# Patient Record
Sex: MALE | Race: WHITE | NOT HISPANIC OR LATINO | URBAN - METROPOLITAN AREA
[De-identification: names, ages, dates, MRNs, and addresses within clinical notes are randomized per-mention and may not be internally consistent; named-entity substitution may affect disease eponyms.]

---

## 2019-09-05 ENCOUNTER — OUTPATIENT (OUTPATIENT)
Dept: OUTPATIENT SERVICES | Facility: HOSPITAL | Age: 64
LOS: 1 days | End: 2019-09-05
Payer: MEDICARE

## 2019-09-05 VITALS
WEIGHT: 216.49 LBS | HEIGHT: 70 IN | DIASTOLIC BLOOD PRESSURE: 79 MMHG | OXYGEN SATURATION: 98 % | SYSTOLIC BLOOD PRESSURE: 124 MMHG | RESPIRATION RATE: 15 BRPM | TEMPERATURE: 98 F | HEART RATE: 54 BPM

## 2019-09-05 DIAGNOSIS — Z98.890 OTHER SPECIFIED POSTPROCEDURAL STATES: Chronic | ICD-10-CM

## 2019-09-05 DIAGNOSIS — M75.42 IMPINGEMENT SYNDROME OF LEFT SHOULDER: ICD-10-CM

## 2019-09-05 DIAGNOSIS — M25.512 PAIN IN LEFT SHOULDER: ICD-10-CM

## 2019-09-05 DIAGNOSIS — Z01.818 ENCOUNTER FOR OTHER PREPROCEDURAL EXAMINATION: ICD-10-CM

## 2019-09-05 DIAGNOSIS — M75.122 COMPLETE ROTATOR CUFF TEAR OR RUPTURE OF LEFT SHOULDER, NOT SPECIFIED AS TRAUMATIC: ICD-10-CM

## 2019-09-05 LAB
ANION GAP SERPL CALC-SCNC: 6 MMOL/L — SIGNIFICANT CHANGE UP (ref 5–17)
BUN SERPL-MCNC: 21 MG/DL — SIGNIFICANT CHANGE UP (ref 7–23)
CALCIUM SERPL-MCNC: 9.4 MG/DL — SIGNIFICANT CHANGE UP (ref 8.4–10.5)
CHLORIDE SERPL-SCNC: 103 MMOL/L — SIGNIFICANT CHANGE UP (ref 96–108)
CO2 SERPL-SCNC: 29 MMOL/L — SIGNIFICANT CHANGE UP (ref 22–31)
CREAT SERPL-MCNC: 1.28 MG/DL — SIGNIFICANT CHANGE UP (ref 0.5–1.3)
GLUCOSE SERPL-MCNC: 103 MG/DL — HIGH (ref 70–99)
HCT VFR BLD CALC: 44.3 % — SIGNIFICANT CHANGE UP (ref 39–50)
HGB BLD-MCNC: 14.4 G/DL — SIGNIFICANT CHANGE UP (ref 13–17)
MCHC RBC-ENTMCNC: 28.6 PG — SIGNIFICANT CHANGE UP (ref 27–34)
MCHC RBC-ENTMCNC: 32.5 GM/DL — SIGNIFICANT CHANGE UP (ref 32–36)
MCV RBC AUTO: 88.1 FL — SIGNIFICANT CHANGE UP (ref 80–100)
NRBC # BLD: 0 /100 WBCS — SIGNIFICANT CHANGE UP (ref 0–0)
PLATELET # BLD AUTO: 279 K/UL — SIGNIFICANT CHANGE UP (ref 150–400)
POTASSIUM SERPL-MCNC: 4.6 MMOL/L — SIGNIFICANT CHANGE UP (ref 3.5–5.3)
POTASSIUM SERPL-SCNC: 4.6 MMOL/L — SIGNIFICANT CHANGE UP (ref 3.5–5.3)
RBC # BLD: 5.03 M/UL — SIGNIFICANT CHANGE UP (ref 4.2–5.8)
RBC # FLD: 13.4 % — SIGNIFICANT CHANGE UP (ref 10.3–14.5)
SODIUM SERPL-SCNC: 138 MMOL/L — SIGNIFICANT CHANGE UP (ref 135–145)
WBC # BLD: 7.43 K/UL — SIGNIFICANT CHANGE UP (ref 3.8–10.5)
WBC # FLD AUTO: 7.43 K/UL — SIGNIFICANT CHANGE UP (ref 3.8–10.5)

## 2019-09-05 PROCEDURE — 85027 COMPLETE CBC AUTOMATED: CPT

## 2019-09-05 PROCEDURE — 36415 COLL VENOUS BLD VENIPUNCTURE: CPT

## 2019-09-05 PROCEDURE — 93005 ELECTROCARDIOGRAM TRACING: CPT

## 2019-09-05 PROCEDURE — 93010 ELECTROCARDIOGRAM REPORT: CPT

## 2019-09-05 PROCEDURE — 80048 BASIC METABOLIC PNL TOTAL CA: CPT

## 2019-09-05 PROCEDURE — G0463: CPT

## 2019-09-05 RX ORDER — VORTIOXETINE 5 MG/1
1 TABLET, FILM COATED ORAL
Qty: 0 | Refills: 0 | DISCHARGE

## 2019-09-05 NOTE — H&P PST ADULT - SKIN COMMENTS
partial thickness abrasion on dorsal surface of left wrist-approx 0.5cm- open w/ no erythema or exudate

## 2019-09-05 NOTE — H&P PST ADULT - NSICDXFAMILYHX_GEN_ALL_CORE_FT
FAMILY HISTORY:  Family history of prostate cancer in father    Father  Still living? No  Family history of throat cancer, Age at diagnosis: Age Unknown    Mother  Still living? No  Family history of lung cancer, Age at diagnosis: Age Unknown

## 2019-09-05 NOTE — H&P PST ADULT - ASSESSMENT
63yo male patient scheduled for surgery on 9/24/19. He will obtain Medical and Cardiac Clearances. He will be NPO as per Anesthesia and will take Toprol XL, Enalapril and Dexilant (and if needed Alprazolam) with a sip of water on AM of surgery. He will hold CBD, Fish Oil, Tumeric, Meloxicam, COQ10 and Aspirin for one week pre-op. All pre-op instructions reviewed with patient.

## 2019-09-05 NOTE — H&P PST ADULT - NSICDXPASTSURGICALHX_GEN_ALL_CORE_FT
PAST SURGICAL HISTORY:  History of abdominal surgery lysis of adgesions, 1995    History of arthroscopy of left shoulder 2017    History of exploratory laparotomy age 13    History of hand surgery left, 2016, 2017    History of hernia repair 1971    History of shoulder surgery right, 1978

## 2019-09-05 NOTE — H&P PST ADULT - NSANTHOSAYNRD_GEN_A_CORE
No. DARIEL screening performed.  STOP BANG Legend: 0-2 = LOW Risk; 3-4 = INTERMEDIATE Risk; 5-8 = HIGH Risk

## 2019-09-05 NOTE — H&P PST ADULT - NSICDXPASTMEDICALHX_GEN_ALL_CORE_FT
PAST MEDICAL HISTORY:  CAD (coronary artery disease)     Chronic lower back pain     Dyslipidemia     GERD (gastroesophageal reflux disease)     History of basal cell carcinoma (BCC) excision nose, 2019    Hypertension PAST MEDICAL HISTORY:  CAD (coronary artery disease)     Chronic lower back pain     Dyslipidemia     GERD (gastroesophageal reflux disease)     H/O Olga's syndrome     History of basal cell carcinoma (BCC) excision nose, 2019    Hypertension     Sinus bradycardia     Traumatic brain injury s/p fall -2016- mild

## 2019-09-05 NOTE — H&P PST ADULT - NSICDXPROBLEM_GEN_ALL_CORE_FT
PROBLEM DIAGNOSES  Problem: Left shoulder pain  Assessment and Plan: LEFT Shoulder Arthroscopy, Debridement Synovectomy, Subacromial   Decompression, Distal Clavicle Resection, Possible Rotator Cuff Repair on 9/24/19.

## 2019-09-05 NOTE — H&P PST ADULT - MUSCULOSKELETAL
details… detailed exam no calf tenderness/decreased ROM due to pain/diminished strength/no joint swelling/no joint erythema/decreased ROM/no joint warmth/left shoulder

## 2019-09-05 NOTE — H&P PST ADULT - HISTORY OF PRESENT ILLNESS
65yo right hand dominant male patient with approximately 3 1/2yr history of progressively worsening left shoulder pain. He has had arthroscopic surgery on his left shoulder approximately 2yrs ago. He rates the pain at 7-8/10-constant radiating to his left bicep area. He is taking Gabapentin, Robaxin and CBD Oil and occasionally Meloxicam and uses ice applications to relieve the pain with partial relief. He has had Cortisone injections and PT w/o significant improvement. He was told that arthroscopy is recommended and presents today for PSTs.

## 2019-09-05 NOTE — H&P PST ADULT - ATTENDING COMMENTS
To the best of my ability as an orthopaedic surgeon, I assert this.  There are no changes orthopaedically.  Otherwise as per anesthesia and/or medicine.

## 2019-09-23 ENCOUNTER — TRANSCRIPTION ENCOUNTER (OUTPATIENT)
Age: 64
End: 2019-09-23

## 2019-09-23 NOTE — ASU PATIENT PROFILE, ADULT - PRESSURE ULCER(S)
Encounter Date: 9/19/2017       History     Chief Complaint   Patient presents with    Headache     since 0300, reports pain increased with light      59 y.o. Female with past medical history of hypertension, tobacco abuse, asthma presents to emergency department complaining of acute, bilateral headaches that woke her from sleep at around 3 AM this morning.  She states pain is throbbing in nature and was 9 out of 10 at onset.  She reports taking Aleve at 3 AM with minimal relief.  She states headache is currently 8 out of 10 in severity.        The history is provided by the patient.   Headache    This is a recurrent (every 3-4 months or so) problem. The problem has been unchanged. The pain is located in the bilateral region. The pain does not radiate. The quality of the pain is described as throbbing. Associated symptoms include coughing, nausea and photophobia. Pertinent negatives include no abdominal pain, fever, vomiting or weakness. The symptoms are aggravated by bright light. She has tried NSAIDs for the symptoms. The treatment provided no relief. Her past medical history is significant for sinus disease.     Review of patient's allergies indicates:   Allergen Reactions    Iodine and iodide containing products      Past Medical History:   Diagnosis Date    Asthma     Hypertension      History reviewed. No pertinent surgical history.  History reviewed. No pertinent family history.  Social History   Substance Use Topics    Smoking status: Current Every Day Smoker     Packs/day: 0.50    Smokeless tobacco: Never Used    Alcohol use No     Review of Systems   Constitutional: Negative for appetite change, fatigue and fever.   HENT: Positive for congestion and postnasal drip.    Eyes: Positive for photophobia. Negative for discharge and visual disturbance.   Respiratory: Positive for cough. Negative for shortness of breath.    Cardiovascular: Negative for chest pain, palpitations and leg swelling.    Gastrointestinal: Positive for nausea. Negative for abdominal pain and vomiting.   Genitourinary: Negative for dysuria and hematuria.   Musculoskeletal: Negative for arthralgias and myalgias.   Neurological: Positive for light-headedness and headaches. Negative for weakness.   All other systems reviewed and are negative.      Physical Exam     Initial Vitals [09/19/17 1615]   BP Pulse Resp Temp SpO2   119/69 72 18 97.2 °F (36.2 °C) 97 %      MAP       85.67         Physical Exam    Nursing note and vitals reviewed.  Constitutional: She appears well-developed and well-nourished. She is not diaphoretic. No distress.   HENT:   Head: Normocephalic and atraumatic.   Mouth/Throat: Oropharynx is clear and moist. No oropharyngeal exudate.   Eyes: EOM are normal. Pupils are equal, round, and reactive to light.   Neck: Normal range of motion. Neck supple.   Cardiovascular: Normal rate, regular rhythm and intact distal pulses.   No murmur heard.  Pulmonary/Chest: Breath sounds normal. No stridor. No respiratory distress.   Abdominal: Soft. Bowel sounds are normal. There is no tenderness.   Musculoskeletal: Normal range of motion. She exhibits no edema or tenderness.   Neurological: She is alert and oriented to person, place, and time. She has normal strength. No cranial nerve deficit.   Skin: Skin is warm and dry. No erythema. No pallor.   Psychiatric: She has a normal mood and affect.         ED Course   Procedures  Labs Reviewed   POCT GLUCOSE                               ED Course as of Oct 04 0601   Tue Sep 19, 2017   1901 Headache improving. Now 3/10. Requesting discharge.   [DL]      ED Course User Index  [DL] Gerard Adams MD     Labs Reviewed  Admission on 09/19/2017   Component Date Value Ref Range Status    POCT Glucose 09/19/2017 82  70 - 110 mg/dL Final        Imaging Reviewed    Imaging Results          CT Head Without Contrast (Final result)  Result time 09/19/17 17:46:38    Final result by Raman JACOBSEN  MD Lucrecia (09/19/17 17:46:38)                 Impression:        No acute intracranial abnormalities.            Electronically signed by: SAM CARTER MD  Date:     09/19/17  Time:    17:46              Narrative:    Comparison: None    Clinical history: Headache    Technique:    Axial images of the brain were obtained at 5-mm intervals from the skull base to the vertex without the administration of contrast.    Findings:    The brain is normally formed and exhibits normal density throughout.  There is no evidence of acute major vascular territory infarct, hemorrhage, or mass.  There is no hydrocephalus.  There are no abnormal extra-axial fluid collections.  There is hypoplasia involving the inferior left mastoid air cells, otherwise the visualized paranasal sinuses and mastoid air cells are clear, and there is no evidence of calvarial fracture.  The visualized soft tissues are unremarkable.                              Medications given in ED    Medications   diphenhydrAMINE injection 50 mg (50 mg Intramuscular Given 9/19/17 1805)   ketorolac injection 60 mg (60 mg Intramuscular Given 9/19/17 1805)   prochlorperazine injection Soln 10 mg (10 mg Intramuscular Given 9/19/17 1805)       Discharge Medications     Medication List with Changes/Refills   New Medications    BUTALBITAL-ACETAMINOPHEN-CAFFEINE -40 MG (FIORICET, ESGIC) -40 MG PER TABLET    Take 1 tablet by mouth every 4 (four) hours as needed for Headaches.   Current Medications    ALBUTEROL 90 MCG/ACTUATION INHALER    Inhale 1-2 puffs into the lungs every 6 (six) hours as needed for Wheezing. Rescue    CETIRIZINE (ZYRTEC) 10 MG TABLET    Take 1 tablet (10 mg total) by mouth once daily. May buy Over the Counter.    FLUTICASONE (FLONASE) 50 MCG/ACTUATION NASAL SPRAY    2 sprays by Each Nare route 2 (two) times daily as needed for Rhinitis or Allergies.    IBUPROFEN (ADVIL,MOTRIN) 600 MG TABLET    Take 1 tablet (600 mg total) by mouth every 6  (six) hours as needed for Pain or Temperature greater than (100.4).    LISINOPRIL (PRINIVIL,ZESTRIL) 40 MG TABLET    Take 40 mg by mouth once daily.             Patient discharged to home in stable condition with instructions to:   1. Please take all meds as prescribed.  2. Follow-up with your primary care doctor   3. Return precautions discussed and patient and/or family/caretaker understands to return to the emergency room for any concerns including worsening of your current symptoms, fever, chills, night sweats, worsening pain, chest pain, shortness of breath, nausea, vomiting, diarrhea, bleeding, headache, difficulty talking, visual disturbances, weakness, numbness or any other acute concerns    Note was created using voice recognition software. Note may have occasional typographical errors that may not have been identified and edited despite good xander initial review prior to signing.    Clinical Impression:   The encounter diagnosis was Acute non intractable tension-type headache.                           Gerard Adams MD  10/04/17 0601     no

## 2019-09-23 NOTE — ASU PATIENT PROFILE, ADULT - PSH
History of abdominal surgery  lysis of adgesions, 1995  History of arthroscopy of left shoulder  2017  History of exploratory laparotomy  age 13  History of hand surgery  left, 2016, 2017  History of hernia repair  1971  History of shoulder surgery  right, 1978

## 2019-09-23 NOTE — ASU PATIENT PROFILE, ADULT - PMH
CAD (coronary artery disease)    Chronic lower back pain    Dyslipidemia    GERD (gastroesophageal reflux disease)    H/O Olga's syndrome    History of basal cell carcinoma (BCC) excision  nose, 2019  Hypertension    Sinus bradycardia    Traumatic brain injury  s/p fall -2016- mild

## 2019-09-24 ENCOUNTER — OUTPATIENT (OUTPATIENT)
Dept: OUTPATIENT SERVICES | Facility: HOSPITAL | Age: 64
LOS: 1 days | End: 2019-09-24
Payer: MEDICARE

## 2019-09-24 ENCOUNTER — RESULT REVIEW (OUTPATIENT)
Age: 64
End: 2019-09-24

## 2019-09-24 VITALS
RESPIRATION RATE: 13 BRPM | SYSTOLIC BLOOD PRESSURE: 106 MMHG | DIASTOLIC BLOOD PRESSURE: 62 MMHG | OXYGEN SATURATION: 95 % | HEART RATE: 69 BPM

## 2019-09-24 VITALS
HEART RATE: 59 BPM | OXYGEN SATURATION: 97 % | WEIGHT: 210.1 LBS | TEMPERATURE: 98 F | HEIGHT: 70 IN | DIASTOLIC BLOOD PRESSURE: 77 MMHG | RESPIRATION RATE: 16 BRPM | SYSTOLIC BLOOD PRESSURE: 120 MMHG

## 2019-09-24 DIAGNOSIS — M75.122 COMPLETE ROTATOR CUFF TEAR OR RUPTURE OF LEFT SHOULDER, NOT SPECIFIED AS TRAUMATIC: ICD-10-CM

## 2019-09-24 DIAGNOSIS — Z98.890 OTHER SPECIFIED POSTPROCEDURAL STATES: Chronic | ICD-10-CM

## 2019-09-24 DIAGNOSIS — M75.42 IMPINGEMENT SYNDROME OF LEFT SHOULDER: ICD-10-CM

## 2019-09-24 DIAGNOSIS — Z01.818 ENCOUNTER FOR OTHER PREPROCEDURAL EXAMINATION: ICD-10-CM

## 2019-09-24 PROCEDURE — 29824 SHO ARTHRS SRG DSTL CLAVICLC: CPT | Mod: LT

## 2019-09-24 PROCEDURE — 88304 TISSUE EXAM BY PATHOLOGIST: CPT

## 2019-09-24 PROCEDURE — 29828 SHO ARTHRS SRG BICP TENODSIS: CPT | Mod: LT

## 2019-09-24 PROCEDURE — 29826 SHO ARTHRS SRG DECOMPRESSION: CPT | Mod: LT

## 2019-09-24 PROCEDURE — 88304 TISSUE EXAM BY PATHOLOGIST: CPT | Mod: 26

## 2019-09-24 RX ORDER — ASPIRIN/CALCIUM CARB/MAGNESIUM 324 MG
1 TABLET ORAL
Qty: 0 | Refills: 0 | DISCHARGE

## 2019-09-24 RX ORDER — MILK THISTLE 150 MG
1 CAPSULE ORAL
Qty: 0 | Refills: 0 | DISCHARGE

## 2019-09-24 RX ORDER — UBIDECARENONE 100 MG
0 CAPSULE ORAL
Qty: 0 | Refills: 0 | DISCHARGE

## 2019-09-24 RX ORDER — OMEGA-3 ACID ETHYL ESTERS 1 G
0 CAPSULE ORAL
Qty: 0 | Refills: 0 | DISCHARGE

## 2019-09-24 RX ORDER — FENOFIBRATE,MICRONIZED 130 MG
1 CAPSULE ORAL
Qty: 0 | Refills: 0 | DISCHARGE

## 2019-09-24 RX ORDER — ONDANSETRON 8 MG/1
4 TABLET, FILM COATED ORAL EVERY 4 HOURS
Refills: 0 | Status: DISCONTINUED | OUTPATIENT
Start: 2019-09-24 | End: 2019-09-24

## 2019-09-24 RX ORDER — ALPRAZOLAM 0.25 MG
1 TABLET ORAL
Qty: 0 | Refills: 0 | DISCHARGE

## 2019-09-24 RX ORDER — MULTIVIT-MIN/FERROUS GLUCONATE 9 MG/15 ML
1 LIQUID (ML) ORAL
Qty: 0 | Refills: 0 | DISCHARGE

## 2019-09-24 RX ORDER — CEFAZOLIN SODIUM 1 G
2000 VIAL (EA) INJECTION ONCE
Refills: 0 | Status: COMPLETED | OUTPATIENT
Start: 2019-09-24 | End: 2019-09-24

## 2019-09-24 RX ORDER — SODIUM CHLORIDE 9 MG/ML
1000 INJECTION, SOLUTION INTRAVENOUS
Refills: 0 | Status: DISCONTINUED | OUTPATIENT
Start: 2019-09-24 | End: 2019-09-24

## 2019-09-24 RX ORDER — CHLORHEXIDINE GLUCONATE 213 G/1000ML
1 SOLUTION TOPICAL ONCE
Refills: 0 | Status: COMPLETED | OUTPATIENT
Start: 2019-09-24 | End: 2019-09-24

## 2019-09-24 RX ORDER — ATORVASTATIN CALCIUM 80 MG/1
1 TABLET, FILM COATED ORAL
Qty: 0 | Refills: 0 | DISCHARGE

## 2019-09-24 RX ORDER — L.ACIDOPH/B.ANIMALIS/B.LONGUM 15B CELL
1 CAPSULE ORAL
Qty: 0 | Refills: 0 | DISCHARGE

## 2019-09-24 RX ORDER — DEXLANSOPRAZOLE 30 MG/1
1 CAPSULE, DELAYED RELEASE ORAL
Qty: 0 | Refills: 0 | DISCHARGE

## 2019-09-24 RX ORDER — ONDANSETRON 8 MG/1
4 TABLET, FILM COATED ORAL EVERY 4 HOURS
Refills: 0 | Status: DISCONTINUED | OUTPATIENT
Start: 2019-09-24 | End: 2019-10-19

## 2019-09-24 RX ORDER — ONDANSETRON 8 MG/1
4 TABLET, FILM COATED ORAL ONCE
Refills: 0 | Status: DISCONTINUED | OUTPATIENT
Start: 2019-09-24 | End: 2019-09-24

## 2019-09-24 RX ORDER — CYCLOBENZAPRINE HYDROCHLORIDE 10 MG/1
1 TABLET, FILM COATED ORAL
Qty: 0 | Refills: 0 | DISCHARGE

## 2019-09-24 RX ORDER — CHOLECALCIFEROL (VITAMIN D3) 125 MCG
1 CAPSULE ORAL
Qty: 0 | Refills: 0 | DISCHARGE

## 2019-09-24 RX ORDER — VORTIOXETINE 5 MG/1
1 TABLET, FILM COATED ORAL
Qty: 0 | Refills: 0 | DISCHARGE

## 2019-09-24 RX ORDER — METOPROLOL TARTRATE 50 MG
1 TABLET ORAL
Qty: 0 | Refills: 0 | DISCHARGE

## 2019-09-24 RX ORDER — HYDROMORPHONE HYDROCHLORIDE 2 MG/ML
0.5 INJECTION INTRAMUSCULAR; INTRAVENOUS; SUBCUTANEOUS
Refills: 0 | Status: DISCONTINUED | OUTPATIENT
Start: 2019-09-24 | End: 2019-09-24

## 2019-09-24 RX ORDER — GABAPENTIN 400 MG/1
0 CAPSULE ORAL
Qty: 0 | Refills: 0 | DISCHARGE

## 2019-09-24 RX ORDER — KETOROLAC TROMETHAMINE 30 MG/ML
15 SYRINGE (ML) INJECTION ONCE
Refills: 0 | Status: DISCONTINUED | OUTPATIENT
Start: 2019-09-24 | End: 2019-09-24

## 2019-09-24 RX ORDER — MELOXICAM 15 MG/1
1 TABLET ORAL
Qty: 0 | Refills: 0 | DISCHARGE

## 2019-09-24 RX ORDER — DIPHENHYDRAMINE HCL 50 MG
50 CAPSULE ORAL EVERY 4 HOURS
Refills: 0 | Status: DISCONTINUED | OUTPATIENT
Start: 2019-09-24 | End: 2019-10-19

## 2019-09-24 RX ORDER — METHOCARBAMOL 500 MG/1
0 TABLET, FILM COATED ORAL
Qty: 0 | Refills: 0 | DISCHARGE

## 2019-09-24 RX ORDER — ASCORBIC ACID 60 MG
1 TABLET,CHEWABLE ORAL
Qty: 0 | Refills: 0 | DISCHARGE

## 2019-09-24 RX ADMIN — CHLORHEXIDINE GLUCONATE 1 APPLICATION(S): 213 SOLUTION TOPICAL at 10:06

## 2019-09-24 RX ADMIN — Medication 15 MILLIGRAM(S): at 13:41

## 2019-09-24 RX ADMIN — SODIUM CHLORIDE 50 MILLILITER(S): 9 INJECTION, SOLUTION INTRAVENOUS at 13:40

## 2019-09-24 RX ADMIN — Medication 15 MILLIGRAM(S): at 14:07

## 2019-09-24 NOTE — BRIEF OPERATIVE NOTE - OPERATION/FINDINGS
Left Shoulder Arthroscopy with Glenohumeral Debridement, Left Shoulder Arthroscopy with Glenohumeral Debridement, Biceps Tenodesis, Subacromial Decompression, Distal Clavicle Resection

## 2019-09-24 NOTE — ASU DISCHARGE PLAN (ADULT/PEDIATRIC) - ACTIVITY LEVEL
No weight bearing/No excercise/No sports/gym/No heavy lifting No weight bearing/No excercise/No heavy lifting/No sports/gym/Elevate extremity

## 2019-09-24 NOTE — ASU DISCHARGE PLAN (ADULT/PEDIATRIC) - CALL YOUR DOCTOR IF YOU HAVE ANY OF THE FOLLOWING:
Nausea and vomiting that does not stop/Swelling that gets worse/Bleeding that does not stop/Pain not relieved by Medications/Fever greater than (need to indicate Fahrenheit or Celsius)/Numbness, tingling, color or temperature change to extremity/Wound/Surgical Site with redness, or foul smelling discharge or pus

## 2022-10-06 ENCOUNTER — RESULT REVIEW (OUTPATIENT)
Age: 67
End: 2022-10-06

## 2022-10-06 ENCOUNTER — OUTPATIENT (OUTPATIENT)
Dept: OUTPATIENT SERVICES | Facility: HOSPITAL | Age: 67
LOS: 1 days | End: 2022-10-06
Payer: MEDICARE

## 2022-10-06 DIAGNOSIS — Z98.890 OTHER SPECIFIED POSTPROCEDURAL STATES: Chronic | ICD-10-CM

## 2022-10-06 DIAGNOSIS — Q40.1 CONGENITAL HIATUS HERNIA: ICD-10-CM

## 2022-10-06 PROBLEM — I10 ESSENTIAL (PRIMARY) HYPERTENSION: Chronic | Status: ACTIVE | Noted: 2019-09-04

## 2022-10-06 PROBLEM — K21.9 GASTRO-ESOPHAGEAL REFLUX DISEASE WITHOUT ESOPHAGITIS: Chronic | Status: ACTIVE | Noted: 2019-09-04

## 2022-10-06 PROBLEM — Z86.69 PERSONAL HISTORY OF OTHER DISEASES OF THE NERVOUS SYSTEM AND SENSE ORGANS: Chronic | Status: ACTIVE | Noted: 2019-09-05

## 2022-10-06 PROBLEM — I25.10 ATHEROSCLEROTIC HEART DISEASE OF NATIVE CORONARY ARTERY WITHOUT ANGINA PECTORIS: Chronic | Status: ACTIVE | Noted: 2019-09-04

## 2022-10-06 PROBLEM — M54.5 LOW BACK PAIN: Chronic | Status: ACTIVE | Noted: 2019-09-04

## 2022-10-06 PROBLEM — R00.1 BRADYCARDIA, UNSPECIFIED: Chronic | Status: ACTIVE | Noted: 2019-09-05

## 2022-10-06 PROBLEM — E78.5 HYPERLIPIDEMIA, UNSPECIFIED: Chronic | Status: ACTIVE | Noted: 2019-09-04

## 2022-10-06 PROBLEM — S06.9X9A UNSPECIFIED INTRACRANIAL INJURY WITH LOSS OF CONSCIOUSNESS OF UNSPECIFIED DURATION, INITIAL ENCOUNTER: Chronic | Status: ACTIVE | Noted: 2019-09-05

## 2022-10-06 PROCEDURE — 74220 X-RAY XM ESOPHAGUS 1CNTRST: CPT | Mod: 26

## 2022-10-06 PROCEDURE — 74220 X-RAY XM ESOPHAGUS 1CNTRST: CPT

## 2022-12-01 PROBLEM — Z00.00 ENCOUNTER FOR PREVENTIVE HEALTH EXAMINATION: Status: ACTIVE | Noted: 2022-12-01

## 2022-12-05 ENCOUNTER — APPOINTMENT (OUTPATIENT)
Dept: PAIN MANAGEMENT | Facility: CLINIC | Age: 67
End: 2022-12-05

## 2022-12-05 RX ORDER — EZETIMIBE 10 MG/1
10 TABLET ORAL
Qty: 30 | Refills: 0 | Status: ACTIVE | COMMUNITY
Start: 2022-05-09

## 2022-12-05 RX ORDER — MOMETASONE FUROATE 1 MG/G
0.1 OINTMENT TOPICAL
Qty: 45 | Refills: 0 | Status: ACTIVE | COMMUNITY
Start: 2022-10-31

## 2022-12-05 RX ORDER — VORTIOXETINE 10 MG/1
10 TABLET, FILM COATED ORAL
Qty: 90 | Refills: 0 | Status: ACTIVE | COMMUNITY
Start: 2022-11-28

## 2022-12-05 RX ORDER — METOPROLOL SUCCINATE 50 MG/1
50 TABLET, EXTENDED RELEASE ORAL
Qty: 90 | Refills: 0 | Status: ACTIVE | COMMUNITY
Start: 2021-12-28

## 2022-12-05 RX ORDER — ATORVASTATIN CALCIUM 40 MG/1
40 TABLET, FILM COATED ORAL
Qty: 90 | Refills: 0 | Status: ACTIVE | COMMUNITY
Start: 2021-12-28

## 2022-12-05 RX ORDER — AMLODIPINE BESYLATE 5 MG/1
5 TABLET ORAL
Qty: 90 | Refills: 0 | Status: ACTIVE | COMMUNITY
Start: 2021-12-28

## 2022-12-21 ENCOUNTER — APPOINTMENT (OUTPATIENT)
Dept: ORTHOPEDIC SURGERY | Facility: CLINIC | Age: 67
End: 2022-12-21

## 2023-01-10 ENCOUNTER — APPOINTMENT (OUTPATIENT)
Dept: PAIN MANAGEMENT | Facility: CLINIC | Age: 68
End: 2023-01-10

## 2023-06-21 ENCOUNTER — APPOINTMENT (OUTPATIENT)
Dept: PAIN MANAGEMENT | Facility: CLINIC | Age: 68
End: 2023-06-21
Payer: SELF-PAY

## 2023-06-21 VITALS — WEIGHT: 220 LBS | BODY MASS INDEX: 31.5 KG/M2 | HEIGHT: 70 IN

## 2023-06-21 DIAGNOSIS — Z86.59 PERSONAL HISTORY OF OTHER MENTAL AND BEHAVIORAL DISORDERS: ICD-10-CM

## 2023-06-21 DIAGNOSIS — R73.03 PREDIABETES.: ICD-10-CM

## 2023-06-21 PROCEDURE — 99214 OFFICE O/P EST MOD 30 MIN: CPT

## 2023-06-21 PROCEDURE — 99204 OFFICE O/P NEW MOD 45 MIN: CPT

## 2023-06-21 NOTE — HISTORY OF PRESENT ILLNESS
[Lower back] : lower back [10] : 10 [3] : 3 [Dull/Aching] : dull/aching [Radiating] : radiating [Shooting] : shooting [Constant] : constant [Household chores] : household chores [Meds] : meds [Extending back] : extending back [Ice] : ice [Heat] : heat [Sitting] : sitting [Standing] : standing [FreeTextEntry1] : 06/21/2023: follow up today.  He was hospitalized for SBO and pain got worse. Had YI's in the past with relief.  \par Pain in the right buttock and down the right leg to the shin and foot.  Same pain as he has had in the past. Pain in the left thigh when he lays down at night.  taking gabapentin 300mg BID Last right L4/5 L5/S1 TFESI was 12/13/19. \par \par 5/19/21- Patient complains of worsening pain in low back and radiating down right leg. Had epidurals in past with relief.\par \par MRI : 3/15/19: IMPRESSION:\par 1. Multilevel spondylosis with disc desiccation, disc bulges and arthrosis throughout the lumbar \par spine and further detailed above; relatively unchanged.\par 2. Posterior central disc herniation with superior disc extrusion at L1/2; relatively unchanged with \par the exception of the previously evident high signal within and around the site of herniation is \par decreased when compared to prior exam.\par 3. Previously evident right foraminal disc herniation has decreased in size at L2/3 when compared to \par prior exam.\par 4. Central canal stenosis and bilateral neuroforaminal narrowing at L2/3; unchanged.\par 5. Grade 1 retrolisthesis L3, bilateral neuroforaminal narrowing and contact left \par foraminal/extraforaminal L3 nerve root at L3/4.\par 6. Annular tear at the 9 o’clock position at L3/4; not evident on prior exam.\par 7. Right posterior inferior facet cyst is decreased in size at L3/4 when compared to prior exam.\par 8. Bilateral neuroforaminal narrowing with contact bilateral foraminal L4 nerve root at L4/5; \par unchanged.\par 9. Bilateral neuroforaminal narrowing with contact/compression right foraminal L5 nerve root at \par L5/S1; unchanged [] : no [FreeTextEntry7] : from buttocks to right leg and foot  dull pain on the left side  [FreeTextEntry9] : gabapentin  [de-identified] : dec 2019 [TWNoteComboBox1] : 100%

## 2023-06-21 NOTE — PHYSICAL EXAM
[NL (90)] : forward flexion 90 degrees [Extension] : extension [] : no lumbar paraspinal tenderness [de-identified] : extension 20 degrees

## 2023-06-21 NOTE — ASSESSMENT
[FreeTextEntry1] : After discussing various treatment options with the patient including but not limited to oral medications, physical therapy, exercise, modalities as well as interventional spinal injections, we have decided with the following plan:\par \par 1) Intervention Injection Therapy:\par I personally reviewed the MRI/CT scan images and agree with the radiologist's report. The radiological findings were discussed with the patient.\par The risks, benefits, contents and alternatives to injection were explained in full to the patient. Risks outlined include but are not limited to infection,sepsis, bleeding, post-dural puncture headache, nerve damage, temporary increase in pain, syncopal episode, failure to resolve symptoms, allergic reaction, symptom recurrence, and elevation of blood sugar in diabetics. Cortisone may cause immunosuppression. Patient understands the risks. All questions were answered. After discussion of options, patient requested an injection. Information regarding the injection was given to the patient. Which medications to stop prior to the injection was explained to the patient as well.\par \par Follow up in 1-2 weeks post injection for re-evaluation. \par Continue Home exercises, stretching, activity modification, physical therapy, and conservative care.\par \par Patient is presenting with acute/sub-acute radicular pain with impairment in ADLs and functionality.  The pain has not responded sufficiently to  conservative care including nsaid therapy and/or physical therapy.  There is no bleeding tendency, unstable medical condition, or systemic infection. The purpose of the spinal injections is to facilitate active therapy by providing short term relief through reduction of pain and inflammation. \par \par Injections, by themselves, are not likely to provide long-term relief. Rather, active rehabilitation with modified work achieves long-term relief by increasing active ROM, strength and stability. \par \par right L4/5 L5/S1 TFESI\par \par 2) script for zero gravity chair \par \par \par \par \par

## 2023-08-16 ENCOUNTER — APPOINTMENT (OUTPATIENT)
Dept: PAIN MANAGEMENT | Facility: CLINIC | Age: 68
End: 2023-08-16
Payer: MEDICARE

## 2023-08-16 PROCEDURE — 64483 NJX AA&/STRD TFRM EPI L/S 1: CPT | Mod: RT

## 2023-08-16 PROCEDURE — 64484 NJX AA&/STRD TFRM EPI L/S EA: CPT | Mod: RT

## 2023-08-16 NOTE — PROCEDURE
[FreeTextEntry3] : Date of Service: 08/16/2023   Account: [id number]  Patient: ROLANDO BERNABE   YOB: 1955  Age: 68 year   Surgeon: Jennifer Block M.D.  Assistant: None.  Pre-Operative Diagnosis: Lumbosacral Radiculitis (M54.17)  Post Operative Diagnosis: Lumbosacral Radiculitis (M54.17)  Procedure: Right L4-5, L5-S1 transforaminal epidural steroid injection under fluoroscopic guidance.  Anesthesia:             MAC   This procedure was carried out using fluoroscopic guidance.  The risks and benefits of the procedure were discussed extensively with the patient.  The consent of the patient was obtained and the following procedure was performed. The patient was placed in the prone position on the fluoroscopic table and the lumbar area was prepped and draped in a sterile fashion.  The right L4-5 and L5-S1 neural foramen were identified on right oblique  "jessie dog" anatomical view at the 6 o' clock position using fluoroscopic guidance, and the area was marked. The overlying skin and subcutaneous structures were anesthetized using sterile technique with 1% Lidocaine.  A 22 gauge spinal needle was directed toward the inferior (6o'clock) position of the pedicle, which formed the roof of the identified foramen.  Once in the epidural space, after negative aspiration for heme and CSF, 1cc of Omnipaque contrast was injected to confirm epidural location and assess filling defects and rule out intravascular needle placement.   The following contrast flow and epidurogram was observed: no intravascular or intrathecal flow pattern was noted.  No blood or CSF was aspirated. Omnipaque spread appeared to outline the right L4 and L5 nerve roots and spread medially into the epidural space.    After this, an injectate of 3 cc preservative free normal saline plus 40 mg of Kenalog was injected in the epidural space at each of the two levels.  The needle was subsequently removed.  Vital signs remained normal.  Pulse oximeter was used throughout the procedure and the patient's pulse and oxygen saturation remained within normal limits.  The patient tolerated the procedure well.  There were no complications.  The patient was instructed to apply ice over the injection sites for twenty minutes every two hours for the next 24 to 48 hours.  The patient was also instructed to contact me immediately if there were any problems.  Jennifer Block M.D.

## 2023-08-30 ENCOUNTER — APPOINTMENT (OUTPATIENT)
Dept: PAIN MANAGEMENT | Facility: CLINIC | Age: 68
End: 2023-08-30
Payer: SELF-PAY

## 2023-08-30 VITALS — BODY MASS INDEX: 31.5 KG/M2 | WEIGHT: 220 LBS | HEIGHT: 70 IN

## 2023-08-30 PROCEDURE — 99213 OFFICE O/P EST LOW 20 MIN: CPT

## 2023-08-30 RX ORDER — METHOCARBAMOL 750 MG/1
750 TABLET, FILM COATED ORAL
Qty: 90 | Refills: 0 | Status: ACTIVE | COMMUNITY
Start: 2023-08-30 | End: 1900-01-01

## 2023-08-30 NOTE — HISTORY OF PRESENT ILLNESS
[Lower back] : lower back [10] : 10 [Dull/Aching] : dull/aching [Radiating] : radiating [Shooting] : shooting [Constant] : constant [Household chores] : household chores [Meds] : meds [Extending back] : extending back [Ice] : ice [Heat] : heat [Sitting] : sitting [Standing] : standing [3] : 3 [0] : 0 [FreeTextEntry1] : 8/30/23- fu for right L4/5, L5/S1 TFESI on 8/16. Had 60% relief.   just has pain in left thigh. getting alot of cramping in b/l hamstrings.  Will get new MRI as he has pain in left side. Into left thigh.     06/21/2023: follow up today.  He was hospitalized for SBO and pain got worse. Had YI's in the past with relief.   Pain in the right buttock and down the right leg to the shin and foot.  Same pain as he has had in the past. Pain in the left thigh when he lays down at night.  taking gabapentin 300mg BID Last right L4/5 L5/S1 TFESI was 12/13/19.   5/19/21- Patient complains of worsening pain in low back and radiating down right leg. Had epidurals in past with relief.  MRI : 3/15/19: IMPRESSION: 1. Multilevel spondylosis with disc desiccation, disc bulges and arthrosis throughout the lumbar  spine and further detailed above; relatively unchanged. 2. Posterior central disc herniation with superior disc extrusion at L1/2; relatively unchanged with  the exception of the previously evident high signal within and around the site of herniation is  decreased when compared to prior exam. 3. Previously evident right foraminal disc herniation has decreased in size at L2/3 when compared to  prior exam. 4. Central canal stenosis and bilateral neuroforaminal narrowing at L2/3; unchanged. 5. Grade 1 retrolisthesis L3, bilateral neuroforaminal narrowing and contact left  foraminal/extraforaminal L3 nerve root at L3/4. 6. Annular tear at the 9 o'clock position at L3/4; not evident on prior exam. 7. Right posterior inferior facet cyst is decreased in size at L3/4 when compared to prior exam. 8. Bilateral neuroforaminal narrowing with contact bilateral foraminal L4 nerve root at L4/5;  unchanged. 9. Bilateral neuroforaminal narrowing with contact/compression right foraminal L5 nerve root at  L5/S1; unchanged [] : no [FreeTextEntry7] : from buttocks to right leg and foot  dull pain on the left side  [FreeTextEntry9] : gabapentin  [de-identified] : dec 2019 [TWNoteComboBox1] : 100%

## 2023-08-30 NOTE — REASON FOR VISIT
[Initial Consultation] : an initial pain management consultation [Follow-Up Visit] : a follow-up pain management visit

## 2023-08-30 NOTE — PHYSICAL EXAM
[NL (90)] : forward flexion 90 degrees [Extension] : extension [] : no lumbar paraspinal tenderness [de-identified] : extension 20 degrees

## 2023-08-30 NOTE — PROCEDURE
[FreeTextEntry3] : Date of Service: 08/16/2023   Account: [id number]  Patient: ROLANDO BERNABE   YOB: 1955  Age: 68 year   Surgeon: Jennifer Block M.D.  Assistant: None.  Pre-Operative Diagnosis: Lumbosacral Radiculitis (M54.17)  Post Operative Diagnosis: Lumbosacral Radiculitis (M54.17)  Procedure: Right L4-5, L5-S1 transforaminal epidural steroid injection under fluoroscopic guidance.  Anesthesia:             MAC   This procedure was carried out using fluoroscopic guidance.  The risks and benefits of the procedure were discussed extensively with the patient.  The consent of the patient was obtained and the following procedure was performed. The patient was placed in the prone position on the fluoroscopic table and the lumbar area was prepped and draped in a sterile fashion.  The right L4-5 and L5-S1 neural foramen were identified on right oblique  "jessie dog" anatomical view at the 6 o' clock position using fluoroscopic guidance, and the area was marked. The overlying skin and subcutaneous structures were anesthetized using sterile technique with 1% Lidocaine.  A 22 gauge spinal needle was directed toward the inferior (6o'clock) position of the pedicle, which formed the roof of the identified foramen.  Once in the epidural space, after negative aspiration for heme and CSF, 1cc of Omnipaque contrast was injected to confirm epidural location and assess filling defects and rule out intravascular needle placement.   The following contrast flow and epidurogram was observed: no intravascular or intrathecal flow pattern was noted.  No blood or CSF was aspirated. Omnipaque spread appeared to outline the right L4 and L5 nerve roots and spread medially into the epidural space.    After this, an injectate of 3 cc preservative free normal saline plus 40 mg of Kenalog was injected in the epidural space at each of the two levels.  The needle was subsequently removed.  Vital signs remained normal.  Pulse oximeter was used throughout the procedure and the patient's pulse and oxygen saturation remained within normal limits.  The patient tolerated the procedure well.  There were no complications.  The patient was instructed to apply ice over the injection sites for twenty minutes every two hours for the next 24 to 48 hours.  The patient was also instructed to contact me immediately if there were any problems.  Jennifer Block M.D.

## 2023-08-30 NOTE — ASSESSMENT
[FreeTextEntry1] : After discussing various treatment options with the patient including but not limited to oral medications, physical therapy, exercise, modalities as well as interventional spinal injections, we have decided with the following plan:  1) Intervention Injection Therapy: I personally reviewed the MRI/CT scan images and agree with the radiologist's report. The radiological findings were discussed with the patient. The risks, benefits, contents and alternatives to injection were explained in full to the patient. Risks outlined include but are not limited to infection,sepsis, bleeding, post-dural puncture headache, nerve damage, temporary increase in pain, syncopal episode, failure to resolve symptoms, allergic reaction, symptom recurrence, and elevation of blood sugar in diabetics. Cortisone may cause immunosuppression. Patient understands the risks. All questions were answered. After discussion of options, patient requested an injection. Information regarding the injection was given to the patient. Which medications to stop prior to the injection was explained to the patient as well.  Follow up in 1-2 weeks post injection for re-evaluation.  Continue Home exercises, stretching, activity modification, physical therapy, and conservative care.  Patient is presenting with acute/sub-acute radicular pain with impairment in ADLs and functionality.  The pain has not responded sufficiently to  conservative care including nsaid therapy and/or physical therapy.  There is no bleeding tendency, unstable medical condition, or systemic infection. The purpose of the spinal injections is to facilitate active therapy by providing short term relief through reduction of pain and inflammation.   Injections, by themselves, are not likely to provide long-term relief. Rather, active rehabilitation with modified work achieves long-term relief by increasing active ROM, strength and stability.   right L4/5 L5/S1 TFESI- will call  2) script for zero gravity chair

## 2023-09-18 ENCOUNTER — APPOINTMENT (OUTPATIENT)
Dept: MRI IMAGING | Facility: CLINIC | Age: 68
End: 2023-09-18
Payer: SELF-PAY

## 2023-09-18 PROCEDURE — 72148 MRI LUMBAR SPINE W/O DYE: CPT

## 2023-09-27 ENCOUNTER — APPOINTMENT (OUTPATIENT)
Dept: PAIN MANAGEMENT | Facility: CLINIC | Age: 68
End: 2023-09-27
Payer: MEDICARE

## 2023-09-27 VITALS — BODY MASS INDEX: 30.06 KG/M2 | WEIGHT: 210 LBS | HEIGHT: 70 IN

## 2023-09-27 DIAGNOSIS — M54.16 RADICULOPATHY, LUMBAR REGION: ICD-10-CM

## 2023-09-27 PROCEDURE — 99214 OFFICE O/P EST MOD 30 MIN: CPT

## 2023-10-03 ENCOUNTER — APPOINTMENT (OUTPATIENT)
Dept: SURGERY | Facility: CLINIC | Age: 68
End: 2023-10-03
Payer: MEDICARE

## 2023-10-03 VITALS
TEMPERATURE: 97.4 F | DIASTOLIC BLOOD PRESSURE: 86 MMHG | WEIGHT: 210 LBS | OXYGEN SATURATION: 96 % | BODY MASS INDEX: 30.06 KG/M2 | SYSTOLIC BLOOD PRESSURE: 119 MMHG | HEART RATE: 82 BPM | HEIGHT: 70 IN

## 2023-10-03 DIAGNOSIS — K22.70 BARRETT'S ESOPHAGUS W/OUT DYSPLASIA: ICD-10-CM

## 2023-10-03 DIAGNOSIS — K21.00 DIAPHRAGMATIC HERNIA W/OUT OBSTRUCTION OR GANGRENE: ICD-10-CM

## 2023-10-03 DIAGNOSIS — K44.9 DIAPHRAGMATIC HERNIA W/OUT OBSTRUCTION OR GANGRENE: ICD-10-CM

## 2023-10-03 PROCEDURE — 99204 OFFICE O/P NEW MOD 45 MIN: CPT

## 2023-10-04 ENCOUNTER — APPOINTMENT (OUTPATIENT)
Dept: SURGERY | Facility: CLINIC | Age: 68
End: 2023-10-04

## 2023-10-04 PROBLEM — K44.9 HIATAL HERNIA WITH GERD AND ESOPHAGITIS: Status: ACTIVE | Noted: 2023-10-04

## 2023-10-04 PROBLEM — K22.70 BARRETT'S ESOPHAGUS WITHOUT DYSPLASIA: Status: ACTIVE | Noted: 2023-10-04

## 2023-10-23 ENCOUNTER — RX RENEWAL (OUTPATIENT)
Age: 68
End: 2023-10-23

## 2023-10-23 RX ORDER — GABAPENTIN 300 MG/1
300 CAPSULE ORAL 3 TIMES DAILY
Qty: 90 | Refills: 0 | Status: ACTIVE | COMMUNITY
Start: 2023-06-21 | End: 1900-01-01

## 2024-03-26 ENCOUNTER — APPOINTMENT (OUTPATIENT)
Dept: ORTHOPEDIC SURGERY | Facility: CLINIC | Age: 69
End: 2024-03-26
Payer: MEDICARE

## 2024-03-26 DIAGNOSIS — S90.02XA CONTUSION OF LEFT ANKLE, INITIAL ENCOUNTER: ICD-10-CM

## 2024-03-26 PROCEDURE — 73610 X-RAY EXAM OF ANKLE: CPT | Mod: LT

## 2024-03-26 PROCEDURE — 99213 OFFICE O/P EST LOW 20 MIN: CPT

## 2024-03-26 NOTE — HISTORY OF PRESENT ILLNESS
[6] : 6 [2] : 2 [de-identified] : 03/26/2024:  3 wks medial ankle pain. does not recall specific injury. no tx to date. no prior sig ankle probs. pre DM (a1c 6.0). denies tob.  [] : no [FreeTextEntry1] : left ankle

## 2024-03-26 NOTE — PHYSICAL EXAM
[Left] : left foot and ankle [Mild] : mild swelling of medial ankle [NL (40)] : plantar flexion 40 degrees [NL 30)] : inversion 30 degrees [NL (20)] : eversion 20 degrees [5___] : eversion 5[unfilled]/5 [2+] : dorsalis pedis pulse: 2+ [] : patient ambulates without assistive device [FreeTextEntry8] : mobile nodule -- mildly tender

## 2024-08-06 ENCOUNTER — APPOINTMENT (OUTPATIENT)
Dept: PAIN MANAGEMENT | Facility: CLINIC | Age: 69
End: 2024-08-06

## 2024-08-06 PROCEDURE — 99213 OFFICE O/P EST LOW 20 MIN: CPT

## 2024-08-06 NOTE — HISTORY OF PRESENT ILLNESS
[Lower back] : lower back [3] : 3 [0] : 0 [Dull/Aching] : dull/aching [Radiating] : radiating [Constant] : constant [Household chores] : household chores [Meds] : meds [Extending back] : extending back [Ice] : ice [Heat] : heat [Injection therapy] : injection therapy [Sitting] : sitting [Standing] : standing [4] : 4 [Leisure] : leisure [FreeTextEntry1] : 08/06/2024: Follow up today. Has since closed out his WC case. Pain is in the lower back with intermittent radiation down the right leg. He is currently taking gabapentin and CBD gummies.   09/27/2023: follow up today for MRI results:  (9/18/23) -multilevel lower thoracic and lumbar degenerative disc disease and facet osteoarthrosis with a lower lumbar dextroscoliosis disc herniation at L1-2 without stenosis or nerve root compression right lateral recess disc herniation at L2-3 with significant compression of the right L3 nerve root disc bulge with a left-sided herniation at L3-4 with this significant compression of the left L4 nerve root disc bulge and posterior element degenerative changes at L4-5 without stenosis or nerve root compression L5-S1 there is central and right-sided disc herniation as well as advanced right sided facet osteoarthrosis with compression of the right S1 nerve root in the lateral recess and the right L5 nerve root in the neuroforamen is increased in size and mass effect from the prior MRI study in addition at least some of the right L5 nerve root compression is also due to hypertrophic synovium arising from the degenerated right L5-S1 facet joint Modic type II endplate changes adjacent to L5-S1 disc Modic type I endplate changes adjacent to the L1-L3 4 disc.  Aware of cyst in left kidney  Epidural helped for the right side. Having pain in the left thigh. no n/t currently.  taking gabapentin 300mg BID and Robaxin QHS PRN. Using CBD oil and gummies.   8/30/23- fu for right L4/5, L5/S1 TFESI on 8/16. Had 60% relief.   just has pain in left thigh. getting alot of cramping in b/l hamstrings.  Will get new MRI as he has pain in left side. Into left thigh.     06/21/2023: follow up today.  He was hospitalized for SBO and pain got worse. Had YI's in the past with relief.   Pain in the right buttock and down the right leg to the shin and foot.  Same pain as he has had in the past. Pain in the left thigh when he lays down at night.  taking gabapentin 300mg BID Last right L4/5 L5/S1 TFESI was 12/13/19.   5/19/21- Patient complains of worsening pain in low back and radiating down right leg. Had epidurals in past with relief.  MRI : 3/15/19: IMPRESSION: 1. Multilevel spondylosis with disc desiccation, disc bulges and arthrosis throughout the lumbar  spine and further detailed above; relatively unchanged. 2. Posterior central disc herniation with superior disc extrusion at L1/2; relatively unchanged with  the exception of the previously evident high signal within and around the site of herniation is  decreased when compared to prior exam. 3. Previously evident right foraminal disc herniation has decreased in size at L2/3 when compared to  prior exam. 4. Central canal stenosis and bilateral neuroforaminal narrowing at L2/3; unchanged. 5. Grade 1 retrolisthesis L3, bilateral neuroforaminal narrowing and contact left  foraminal/extraforaminal L3 nerve root at L3/4. 6. Annular tear at the 9 o'clock position at L3/4; not evident on prior exam. 7. Right posterior inferior facet cyst is decreased in size at L3/4 when compared to prior exam. 8. Bilateral neuroforaminal narrowing with contact bilateral foraminal L4 nerve root at L4/5;  unchanged. 9. Bilateral neuroforaminal narrowing with contact/compression right foraminal L5 nerve root at  L5/S1; unchanged [] : no [FreeTextEntry7] : thigh, leg  [FreeTextEntry9] : gabapentin  [de-identified] : dec 2019 [TWNoteComboBox1] : 100%

## 2024-08-06 NOTE — PHYSICAL EXAM
[NL (90)] : forward flexion 90 degrees [Extension] : extension [] : no lumbar paraspinal tenderness [de-identified] : extension 20 degrees

## 2024-08-06 NOTE — HISTORY OF PRESENT ILLNESS
[Lower back] : lower back [3] : 3 [0] : 0 [Dull/Aching] : dull/aching [Radiating] : radiating [Constant] : constant [Household chores] : household chores [Meds] : meds [Extending back] : extending back [Ice] : ice [Heat] : heat [Injection therapy] : injection therapy [Sitting] : sitting [Standing] : standing [4] : 4 [Leisure] : leisure [FreeTextEntry1] : 08/06/2024: Follow up today. Has since closed out his WC case. Pain is in the lower back with intermittent radiation down the right leg. He is currently taking gabapentin and CBD gummies.   09/27/2023: follow up today for MRI results:  (9/18/23) -multilevel lower thoracic and lumbar degenerative disc disease and facet osteoarthrosis with a lower lumbar dextroscoliosis disc herniation at L1-2 without stenosis or nerve root compression right lateral recess disc herniation at L2-3 with significant compression of the right L3 nerve root disc bulge with a left-sided herniation at L3-4 with this significant compression of the left L4 nerve root disc bulge and posterior element degenerative changes at L4-5 without stenosis or nerve root compression L5-S1 there is central and right-sided disc herniation as well as advanced right sided facet osteoarthrosis with compression of the right S1 nerve root in the lateral recess and the right L5 nerve root in the neuroforamen is increased in size and mass effect from the prior MRI study in addition at least some of the right L5 nerve root compression is also due to hypertrophic synovium arising from the degenerated right L5-S1 facet joint Modic type II endplate changes adjacent to L5-S1 disc Modic type I endplate changes adjacent to the L1-L3 4 disc.  Aware of cyst in left kidney  Epidural helped for the right side. Having pain in the left thigh. no n/t currently.  taking gabapentin 300mg BID and Robaxin QHS PRN. Using CBD oil and gummies.   8/30/23- fu for right L4/5, L5/S1 TFESI on 8/16. Had 60% relief.   just has pain in left thigh. getting alot of cramping in b/l hamstrings.  Will get new MRI as he has pain in left side. Into left thigh.     06/21/2023: follow up today.  He was hospitalized for SBO and pain got worse. Had YI's in the past with relief.   Pain in the right buttock and down the right leg to the shin and foot.  Same pain as he has had in the past. Pain in the left thigh when he lays down at night.  taking gabapentin 300mg BID Last right L4/5 L5/S1 TFESI was 12/13/19.   5/19/21- Patient complains of worsening pain in low back and radiating down right leg. Had epidurals in past with relief.  MRI : 3/15/19: IMPRESSION: 1. Multilevel spondylosis with disc desiccation, disc bulges and arthrosis throughout the lumbar  spine and further detailed above; relatively unchanged. 2. Posterior central disc herniation with superior disc extrusion at L1/2; relatively unchanged with  the exception of the previously evident high signal within and around the site of herniation is  decreased when compared to prior exam. 3. Previously evident right foraminal disc herniation has decreased in size at L2/3 when compared to  prior exam. 4. Central canal stenosis and bilateral neuroforaminal narrowing at L2/3; unchanged. 5. Grade 1 retrolisthesis L3, bilateral neuroforaminal narrowing and contact left  foraminal/extraforaminal L3 nerve root at L3/4. 6. Annular tear at the 9 o'clock position at L3/4; not evident on prior exam. 7. Right posterior inferior facet cyst is decreased in size at L3/4 when compared to prior exam. 8. Bilateral neuroforaminal narrowing with contact bilateral foraminal L4 nerve root at L4/5;  unchanged. 9. Bilateral neuroforaminal narrowing with contact/compression right foraminal L5 nerve root at  L5/S1; unchanged [] : no [FreeTextEntry7] : thigh, leg  [FreeTextEntry9] : gabapentin  [de-identified] : dec 2019 [TWNoteComboBox1] : 100%

## 2024-08-06 NOTE — PHYSICAL EXAM
[NL (90)] : forward flexion 90 degrees [Extension] : extension [] : no lumbar paraspinal tenderness [de-identified] : extension 20 degrees

## 2024-08-06 NOTE — ASSESSMENT
[FreeTextEntry1] : After discussing various treatment options with the patient including but not limited to oral medications, physical therapy, exercise, modalities as well as interventional spinal injections, we have decided with the following plan:.plan  1) The patient is stable on current medication with analgesia and without notable side effects or any obvious aberrant behaviors exhibited.   There is clinically meaningful improvement in pain and function that outweighs risks to patient safety.  I have discussed risks and realistic benefits of therapy and patient and clinician responsibilities for managing therapy.   Will renew medication today.   gabapentin and Robaxin  2) not interested in PT currently as he is working part time.

## 2024-09-09 ENCOUNTER — APPOINTMENT (OUTPATIENT)
Dept: ORTHOPEDIC SURGERY | Facility: CLINIC | Age: 69
End: 2024-09-09
Payer: MEDICARE

## 2024-09-09 VITALS — WEIGHT: 210 LBS | BODY MASS INDEX: 30.06 KG/M2 | HEIGHT: 70 IN

## 2024-09-09 DIAGNOSIS — M17.11 UNILATERAL PRIMARY OSTEOARTHRITIS, RIGHT KNEE: ICD-10-CM

## 2024-09-09 PROCEDURE — 73564 X-RAY EXAM KNEE 4 OR MORE: CPT | Mod: RT

## 2024-09-09 PROCEDURE — J3490M: CUSTOM | Mod: NC,JZ

## 2024-09-09 PROCEDURE — 20610 DRAIN/INJ JOINT/BURSA W/O US: CPT | Mod: RT

## 2024-09-09 PROCEDURE — 99214 OFFICE O/P EST MOD 30 MIN: CPT | Mod: 25

## 2024-09-09 NOTE — PROCEDURE
[FreeTextEntry3] : - Large joint injection was performed of the right knee.  - The indication for this procedure was pain and inflammation. Patient has tried OTC's including aspirin, Ibuprofen, Aleve, etc or prescription NSAIDS, and/or exercises at home and/or physical therapy without satisfactory response, patient had decreased mobility in the joint and the risks benefits, and alternatives have been discussed, and verbal consent was obtained. The site was prepped with alcohol, betadine, ethyl chloride sprayed topically and sterile technique used.  - An injection of Betamethasone 2cc; Marcaine 5cc injected. - Patient was advised to call if redness, pain or fever occur, apply ice for 15 minutes out of every hour for the next 12-24 hours as tolerated and patient was advised to rest the joint(s) for 2 days.

## 2024-09-09 NOTE — ASSESSMENT
[FreeTextEntry1] : Acute exacerbation of right knee osteoarthritis. Discussed a comprehensive treatment plan today. Will administer right knee CSI to alleviate pain today. Defers prescription meloxicam today. Can continue with OTC anti-inflammatories as needed. HEP discussed. RTC 1 month. Can consider viscosupplementation down the line.   The patient's current medication management of their orthopedic diagnosis was reviewed today: (1) We discussed a comprehensive treatment plan that included pharmaceutical management involving the use of prescription medications. (2) There is a moderate risk of morbidity with further treatment, especially from use of prescription strength medications and possible side effects of these medications which include upset stomach with oral medications, skin reactions to topical medications and cardiac/renal/diabetes issues with long term use. (3) I recommended that the patient follow-up with their medical physician to discuss any significant specific potential issues with long term medication use such as interactions with current medications or with exacerbation of underlying medical comorbidities. (4) The benefits and risks associated with use of injectable, oral or topical, prescription and over the counter anti-inflammatory medications were discussed with the patient. The patient voiced understanding of the risks including but not limited to bleeding, stroke, kidney dysfunction, heart disease, and were referred to the black box warning label for further information.  I am working today under the supervision of Dr. Rowan and I am following the plan of care of Dr. Rowan as described by him on this date.  Progress note completed by Ruth Bradley PA-C under the supervision of Dr. Rowan.

## 2024-09-09 NOTE — HISTORY OF PRESENT ILLNESS
[Localized] : localized [Sharp] : sharp [Stabbing] : stabbing [Constant] : constant [de-identified] : 9/9/24: pt presents with right knee pain of about a week that is constant. He does not recall injuring it. [] : no

## 2024-09-09 NOTE — IMAGING
[de-identified] : Right Knee Exam: Inspection: No effusion, no warmth, no ecchymosis Palpation: Medial joint line tenderness to palpation, negative Eliecer Range of motion: 0-130 with mild anterior crepitus Strength: 5/5 quadriceps and hamstring strength Stability: Ligamentously stable Motor and sensory intact distally Gait: Non antalgic gait   [Right] : right knee [All Views] : anteroposterior, lateral, skyline, and anteroposterior standing [Mild tricompartmental OA medial narrowing] : Mild tricompartmental OA medial narrowing [Mild tricompartmental OA lateral narrowing] : Mild tricompartmental OA lateral narrowing [Mild patellofemoral OA] : Mild patellofemoral OA

## 2025-04-02 ENCOUNTER — OFFICE (OUTPATIENT)
Dept: URBAN - METROPOLITAN AREA CLINIC 109 | Facility: CLINIC | Age: 70
Setting detail: OPHTHALMOLOGY
End: 2025-04-02
Payer: MEDICARE

## 2025-04-02 DIAGNOSIS — H43.393: ICD-10-CM

## 2025-04-02 DIAGNOSIS — H26.492: ICD-10-CM

## 2025-04-02 PROCEDURE — 92201 OPSCPY EXTND RTA DRAW UNI/BI: CPT | Performed by: OPHTHALMOLOGY

## 2025-04-02 PROCEDURE — 92004 COMPRE OPH EXAM NEW PT 1/>: CPT | Performed by: OPHTHALMOLOGY

## 2025-04-02 ASSESSMENT — REFRACTION_AUTOREFRACTION
OS_SPHERE: +1.00
OD_AXIS: 099
OD_CYLINDER: -1.25
OS_AXIS: 094
OD_SPHERE: +0.75
OS_CYLINDER: -1.25

## 2025-04-02 ASSESSMENT — VISUAL ACUITY
OS_BCVA: 20/25-2
OD_BCVA: 20/20-2

## 2025-04-02 ASSESSMENT — KERATOMETRY
OD_K2POWER_DIOPTERS: 44.00
OS_K2POWER_DIOPTERS: 44.50
OS_K1POWER_DIOPTERS: 44.00
OD_AXISANGLE_DEGREES: 021
OS_AXISANGLE_DEGREES: 180
OD_K1POWER_DIOPTERS: 43.50

## 2025-04-02 ASSESSMENT — CONFRONTATIONAL VISUAL FIELD TEST (CVF)
OS_FINDINGS: FULL
OD_FINDINGS: FULL

## 2025-04-02 ASSESSMENT — TONOMETRY
OD_IOP_MMHG: 16
OS_IOP_MMHG: 14

## 2025-05-07 ENCOUNTER — APPOINTMENT (OUTPATIENT)
Dept: ORTHOPEDIC SURGERY | Facility: CLINIC | Age: 70
End: 2025-05-07
Payer: MEDICARE

## 2025-05-07 DIAGNOSIS — M89.9 DISORDER OF BONE, UNSPECIFIED: ICD-10-CM

## 2025-05-07 DIAGNOSIS — M18.12 UNILATERAL PRIMARY OSTEOARTHRITIS OF FIRST CARPOMETACARPAL JOINT, LEFT HAND: ICD-10-CM

## 2025-05-07 DIAGNOSIS — S63.599A OTHER SPECIFIED SPRAIN OF UNSPECIFIED WRIST, INITIAL ENCOUNTER: ICD-10-CM

## 2025-05-07 PROCEDURE — 73110 X-RAY EXAM OF WRIST: CPT | Mod: LT

## 2025-05-07 PROCEDURE — 99204 OFFICE O/P NEW MOD 45 MIN: CPT

## 2025-05-07 PROCEDURE — 99214 OFFICE O/P EST MOD 30 MIN: CPT

## 2025-05-09 ENCOUNTER — APPOINTMENT (OUTPATIENT)
Dept: MRI IMAGING | Facility: CLINIC | Age: 70
End: 2025-05-09
Payer: MEDICARE

## 2025-05-09 PROCEDURE — 73221 MRI JOINT UPR EXTREM W/O DYE: CPT | Mod: LT

## 2025-05-12 ENCOUNTER — APPOINTMENT (OUTPATIENT)
Dept: PAIN MANAGEMENT | Facility: CLINIC | Age: 70
End: 2025-05-12
Payer: SELF-PAY

## 2025-05-12 VITALS — BODY MASS INDEX: 30.64 KG/M2 | WEIGHT: 214 LBS | HEIGHT: 70 IN

## 2025-05-12 DIAGNOSIS — M54.16 RADICULOPATHY, LUMBAR REGION: ICD-10-CM

## 2025-05-12 PROBLEM — M18.12 PRIMARY OSTEOARTHRITIS OF FIRST CARPOMETACARPAL JOINT OF LEFT HAND: Status: ACTIVE | Noted: 2025-05-12

## 2025-05-12 PROBLEM — M89.9 LESION OF BONE OF WRIST: Status: ACTIVE | Noted: 2025-05-12

## 2025-05-12 PROCEDURE — 99214 OFFICE O/P EST MOD 30 MIN: CPT

## 2025-05-12 RX ORDER — LIDOCAINE 5% 700 MG/1
5 PATCH TOPICAL
Qty: 90 | Refills: 2 | Status: ACTIVE | COMMUNITY
Start: 2025-05-12 | End: 1900-01-01

## 2025-05-21 ENCOUNTER — APPOINTMENT (OUTPATIENT)
Dept: ORTHOPEDIC SURGERY | Facility: CLINIC | Age: 70
End: 2025-05-21

## 2025-05-21 DIAGNOSIS — M89.9 DISORDER OF BONE, UNSPECIFIED: ICD-10-CM

## 2025-05-21 DIAGNOSIS — M18.12 UNILATERAL PRIMARY OSTEOARTHRITIS OF FIRST CARPOMETACARPAL JOINT, LEFT HAND: ICD-10-CM

## 2025-05-21 DIAGNOSIS — S63.599A OTHER SPECIFIED SPRAIN OF UNSPECIFIED WRIST, INITIAL ENCOUNTER: ICD-10-CM

## 2025-05-21 PROCEDURE — 20600 DRAIN/INJ JOINT/BURSA W/O US: CPT | Mod: LT

## 2025-05-21 PROCEDURE — 99213 OFFICE O/P EST LOW 20 MIN: CPT | Mod: 25

## 2025-06-11 ENCOUNTER — APPOINTMENT (OUTPATIENT)
Dept: PAIN MANAGEMENT | Facility: CLINIC | Age: 70
End: 2025-06-11

## 2025-06-20 NOTE — ASU PREOP CHECKLIST - NS PREOP CHK MONITOR ANESTHESIA CONSENT
Use flonase, warm compresses to sinuses  Saline nasal spray to keep congestion coming out  Wait for viral swab to result  If negative and not improving after week gaye- start Augmentin  
done

## 2025-07-15 ENCOUNTER — APPOINTMENT (OUTPATIENT)
Dept: PAIN MANAGEMENT | Facility: CLINIC | Age: 70
End: 2025-07-15